# Patient Record
Sex: MALE | Race: WHITE | URBAN - METROPOLITAN AREA
[De-identification: names, ages, dates, MRNs, and addresses within clinical notes are randomized per-mention and may not be internally consistent; named-entity substitution may affect disease eponyms.]

---

## 2018-07-20 ENCOUNTER — OUTPATIENT (OUTPATIENT)
Dept: OUTPATIENT SERVICES | Facility: HOSPITAL | Age: 48
LOS: 1 days | Discharge: HOME | End: 2018-07-20

## 2018-07-20 DIAGNOSIS — R60.9 EDEMA, UNSPECIFIED: ICD-10-CM

## 2018-08-15 DIAGNOSIS — K56.49 OTHER IMPACTION OF INTESTINE: ICD-10-CM

## 2018-08-15 DIAGNOSIS — M71.22 SYNOVIAL CYST OF POPLITEAL SPACE [BAKER], LEFT KNEE: ICD-10-CM

## 2018-08-30 ENCOUNTER — INPATIENT (INPATIENT)
Facility: HOSPITAL | Age: 48
LOS: 1 days | Discharge: HOME | End: 2018-09-01
Attending: INTERNAL MEDICINE | Admitting: INTERNAL MEDICINE
Payer: COMMERCIAL

## 2018-08-30 VITALS
TEMPERATURE: 97 F | RESPIRATION RATE: 20 BRPM | SYSTOLIC BLOOD PRESSURE: 186 MMHG | OXYGEN SATURATION: 98 % | DIASTOLIC BLOOD PRESSURE: 94 MMHG | HEART RATE: 82 BPM
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
## 2018-08-30 LAB
ANION GAP SERPL CALC-SCNC: 15 MMOL/L — HIGH (ref 7–14)
BASOPHILS # BLD AUTO: 0.02 K/UL — SIGNIFICANT CHANGE UP (ref 0–0.2)
BASOPHILS NFR BLD AUTO: 0.4 % — SIGNIFICANT CHANGE UP (ref 0–1)
BUN SERPL-MCNC: 12 MG/DL — SIGNIFICANT CHANGE UP (ref 10–20)
CALCIUM SERPL-MCNC: 9.2 MG/DL — SIGNIFICANT CHANGE UP (ref 8.5–10.1)
CHLORIDE SERPL-SCNC: 99 MMOL/L — SIGNIFICANT CHANGE UP (ref 98–110)
CO2 SERPL-SCNC: 26 MMOL/L — SIGNIFICANT CHANGE UP (ref 17–32)
CREAT SERPL-MCNC: 0.9 MG/DL — SIGNIFICANT CHANGE UP (ref 0.7–1.5)
EOSINOPHIL # BLD AUTO: 0.04 K/UL — SIGNIFICANT CHANGE UP (ref 0–0.7)
EOSINOPHIL NFR BLD AUTO: 0.9 % — SIGNIFICANT CHANGE UP (ref 0–8)
ERYTHROCYTE [SEDIMENTATION RATE] IN BLOOD: 24 MM/HR — HIGH (ref 0–10)
GLUCOSE SERPL-MCNC: 141 MG/DL — HIGH (ref 70–99)
HCT VFR BLD CALC: 40.8 % — LOW (ref 42–52)
HGB BLD-MCNC: 14.5 G/DL — SIGNIFICANT CHANGE UP (ref 14–18)
IMM GRANULOCYTES NFR BLD AUTO: 0.2 % — SIGNIFICANT CHANGE UP (ref 0.1–0.3)
LYMPHOCYTES # BLD AUTO: 1.42 K/UL — SIGNIFICANT CHANGE UP (ref 1.2–3.4)
LYMPHOCYTES # BLD AUTO: 30.9 % — SIGNIFICANT CHANGE UP (ref 20.5–51.1)
MCHC RBC-ENTMCNC: 32.2 PG — HIGH (ref 27–31)
MCHC RBC-ENTMCNC: 35.5 G/DL — SIGNIFICANT CHANGE UP (ref 32–37)
MCV RBC AUTO: 90.7 FL — SIGNIFICANT CHANGE UP (ref 80–94)
MONOCYTES # BLD AUTO: 0.58 K/UL — SIGNIFICANT CHANGE UP (ref 0.1–0.6)
MONOCYTES NFR BLD AUTO: 12.6 % — HIGH (ref 1.7–9.3)
NEUTROPHILS # BLD AUTO: 2.52 K/UL — SIGNIFICANT CHANGE UP (ref 1.4–6.5)
NEUTROPHILS NFR BLD AUTO: 55 % — SIGNIFICANT CHANGE UP (ref 42.2–75.2)
NRBC # BLD: 0 /100 WBCS — SIGNIFICANT CHANGE UP (ref 0–0)
PLATELET # BLD AUTO: 151 K/UL — SIGNIFICANT CHANGE UP (ref 130–400)
POTASSIUM SERPL-MCNC: 3.6 MMOL/L — SIGNIFICANT CHANGE UP (ref 3.5–5)
POTASSIUM SERPL-SCNC: 3.6 MMOL/L — SIGNIFICANT CHANGE UP (ref 3.5–5)
RBC # BLD: 4.5 M/UL — LOW (ref 4.7–6.1)
RBC # FLD: 12.7 % — SIGNIFICANT CHANGE UP (ref 11.5–14.5)
SODIUM SERPL-SCNC: 140 MMOL/L — SIGNIFICANT CHANGE UP (ref 135–146)
WBC # BLD: 4.59 K/UL — LOW (ref 4.8–10.8)
WBC # FLD AUTO: 4.59 K/UL — LOW (ref 4.8–10.8)

## 2018-08-30 PROCEDURE — 93970 EXTREMITY STUDY: CPT | Mod: 26

## 2018-08-30 RX ORDER — AMPICILLIN SODIUM AND SULBACTAM SODIUM 250; 125 MG/ML; MG/ML
3 INJECTION, POWDER, FOR SUSPENSION INTRAMUSCULAR; INTRAVENOUS ONCE
Qty: 0 | Refills: 0 | Status: COMPLETED | OUTPATIENT
Start: 2018-08-30 | End: 2018-08-30

## 2018-08-30 RX ORDER — ENOXAPARIN SODIUM 100 MG/ML
40 INJECTION SUBCUTANEOUS EVERY 24 HOURS
Qty: 0 | Refills: 0 | Status: DISCONTINUED | OUTPATIENT
Start: 2018-08-30 | End: 2018-09-01

## 2018-08-30 RX ADMIN — AMPICILLIN SODIUM AND SULBACTAM SODIUM 200 GRAM(S): 250; 125 INJECTION, POWDER, FOR SUSPENSION INTRAMUSCULAR; INTRAVENOUS at 20:04

## 2018-08-30 NOTE — ED PROVIDER NOTE - OBJECTIVE STATEMENT
patient states that 6 days ago accidentally hit his Rt leg to wood piece, at that time sustained wound, which he cleaned himself and placed dressing, and did the wound care at home for next two days, on monday suddenly the area around the wound became red with swelling, symptoms continued, patient was in [facility] when this happened, so returned back to [facility] and went to the urgent care center, was given clindamycin PO, took 3.5 days of clindamycin, symptoms did not get better, so went back to urgent care center as he was instructed to do. Patient was sent from urgent care center for further evaluation due to failure of outpatient oral abx. Patient denies any other associated symptoms. Tetanus Toxoid is up to date.

## 2018-08-30 NOTE — H&P ADULT - NSHPPHYSICALEXAM_GEN_ALL_CORE
T(C): 36.2 (08-30-18 @ 17:37), Max: 36.2 (08-30-18 @ 17:37)  HR: 82 (08-30-18 @ 17:37) (82 - 82)  BP: 186/94 (08-30-18 @ 17:37) (186/94 - 186/94)  RR: 20 (08-30-18 @ 17:37) (20 - 20)  SpO2: 98% (08-30-18 @ 17:37) (98% - 98%)    PHYSICAL EXAM:  GENERAL: NAD, speaks in full sentences, no signs of respiratory distress  HEAD:  Atraumatic, Normocephalic  EYES: EOMI, PERRLA, conjunctiva and sclera clear  NECK: Supple, No JVD  CHEST/LUNG: Clear to auscultation bilaterally; No wheeze; No crackles; No accessory muscles used  HEART: Regular rate and rhythm; No murmurs;   ABDOMEN: Soft, Nontender, Nondistended, obese; Bowel sounds present; No guarding  EXTREMITIES:  2+ Peripheral Pulses, No cyanosis or edema  PSYCH: AAOx3  NEUROLOGY: non-focal  SKIN:rt le erythema with swelling, warmth and abrasion with yellow discharge

## 2018-08-30 NOTE — H&P ADULT - HISTORY OF PRESENT ILLNESS
48 y m with pmh of dm, htn, martín on bipap p/w rt le cellulits not improving with oral antibiotics. As per patient , on friday he hit wood and had skin tear and he was having wound care by himself but later he developed rash and swelling of rt LE. on monday, he saw his pmd who started him on clindamycin. Since then, his cellulitis is not improving and so he came to ed. He denies any chills ,fever ,pain, n/v. he denies any other symptoms 48 y m with pmh of dm, htn, martín on bipap p/w rt le cellulits not improving with oral antibiotics. As per patient , on friday he hit wood and had skin tear and he was having wound care by himself but later he developed rash and swelling of rt LE. on monday, he saw his pmd who started him on clindamycin. Since then, his cellulitis is not improving and so he came to ed. He denies any chills ,fever ,pain, n/v. he denies any other symptoms. he received tdap

## 2018-08-30 NOTE — ED ADULT NURSE NOTE - NSIMPLEMENTINTERV_GEN_ALL_ED
Implemented All Universal Safety Interventions:  [place] to call system. Call bell, personal items and telephone within reach. Instruct patient to call for assistance. Room bathroom lighting operational. Non-slip footwear when patient is off stretcher. Physically safe environment: no spills, clutter or unnecessary equipment. Stretcher in lowest position, wheels locked, appropriate side rails in place.

## 2018-08-30 NOTE — H&P ADULT - NSHPLABSRESULTS_GEN_ALL_CORE
14.5   4.59  )-----------( 151      ( 30 Aug 2018 18:06 )             40.8       08-30    140  |  99  |  12  ----------------------------<  141<H>  3.6   |  26  |  0.9    Ca    9.2      30 Aug 2018 18:06                        Lactate Trend            CAPILLARY BLOOD GLUCOSE

## 2018-08-30 NOTE — ED PROVIDER NOTE - CARE PLAN
Principal Discharge DX:	Cellulitis of lower extremity, unspecified laterality  Secondary Diagnosis:	Wound infection

## 2018-08-30 NOTE — ED PROVIDER NOTE - PHYSICAL EXAMINATION
RLE exam: on mid Rt anterior shin has open wound with scanty yellow d/c with large area of surrounding erythema with mild swelling, no crepitus, no fluctuation, knee/ankle joints are not involved, no lymphangitis, distally NVI.

## 2018-08-30 NOTE — ED ADULT TRIAGE NOTE - CHIEF COMPLAINT QUOTE
RLE cellulitis , patient states that he has been on po clindamycin for the past few days redness is getting worse

## 2018-08-30 NOTE — ED PROVIDER NOTE - MEDICAL DECISION MAKING DETAILS
Patient remained stable in ED, discussed with Dr. Adams, patient is admitted to Medicine for IV abx, failed outpatient therapy. Discussed with MAR and patient care is transferred.

## 2018-08-30 NOTE — ED PROVIDER NOTE - PROGRESS NOTE DETAILS
Vascular Tech Lynn called and informed me that patient DVT study is negative. Patient remained stable in ED, discussed with Dr. Adams, patient is admitted to Medicine for IV abx, failed outpatient therapy. Discussed with MAR and patient care is transferred.

## 2018-08-30 NOTE — H&P ADULT - ASSESSMENT
48 y m with pmh of htn, dm, martín on bipapa p/w rt le cellulitis not improving with oral antibiotics    1) rt le cellulitis with yellow discharge and abrasion   will start iv clindamycin  check bc an wound culture  wound care with clean with sterile saline and aply bacitracin and cover with kerlex bid  consider id consult if not improving    2) dm- monitor fs and start insulin if fs >200    3) htn -c/w home meds    4) martín- c/w bipap at night    5) dvt ppx   diet- CC  dispo- home   full code 48 y m with pmh of htn, dm, martín on bipapa p/w rt le cellulitis not improving with oral antibiotics    1) rt le cellulitis with yellow discharge and abrasion   will start iv clindamycin  check bc an wound culture  wound care with clean with sterile saline and aply bacitracin and cover with kerlex bid  consider id consult if not improving  f/u xray    2) dm- monitor fs and start insulin if fs >200    3) htn -c/w home meds    4) martín- c/w bipap at night    5) dvt ppx   diet- CC  dispo- home   full code

## 2018-08-30 NOTE — H&P ADULT - PMH
HTN (hypertension)    RUSTY treated with BiPAP    Type 2 diabetes mellitus with other skin complication

## 2018-08-31 DIAGNOSIS — Z90.3 ACQUIRED ABSENCE OF STOMACH [PART OF]: Chronic | ICD-10-CM

## 2018-08-31 DIAGNOSIS — Z95.828 PRESENCE OF OTHER VASCULAR IMPLANTS AND GRAFTS: Chronic | ICD-10-CM

## 2018-08-31 LAB
ANION GAP SERPL CALC-SCNC: 15 MMOL/L — HIGH (ref 7–14)
BASOPHILS # BLD AUTO: 0.02 K/UL — SIGNIFICANT CHANGE UP (ref 0–0.2)
BASOPHILS NFR BLD AUTO: 0.4 % — SIGNIFICANT CHANGE UP (ref 0–1)
BUN SERPL-MCNC: 12 MG/DL — SIGNIFICANT CHANGE UP (ref 10–20)
CALCIUM SERPL-MCNC: 9.4 MG/DL — SIGNIFICANT CHANGE UP (ref 8.5–10.1)
CHLORIDE SERPL-SCNC: 99 MMOL/L — SIGNIFICANT CHANGE UP (ref 98–110)
CO2 SERPL-SCNC: 26 MMOL/L — SIGNIFICANT CHANGE UP (ref 17–32)
CREAT SERPL-MCNC: 0.9 MG/DL — SIGNIFICANT CHANGE UP (ref 0.7–1.5)
EOSINOPHIL # BLD AUTO: 0.08 K/UL — SIGNIFICANT CHANGE UP (ref 0–0.7)
EOSINOPHIL NFR BLD AUTO: 1.5 % — SIGNIFICANT CHANGE UP (ref 0–8)
GLUCOSE BLDC GLUCOMTR-MCNC: 116 MG/DL — HIGH (ref 70–99)
GLUCOSE BLDC GLUCOMTR-MCNC: 144 MG/DL — HIGH (ref 70–99)
GLUCOSE BLDC GLUCOMTR-MCNC: 148 MG/DL — HIGH (ref 70–99)
GLUCOSE BLDC GLUCOMTR-MCNC: 165 MG/DL — HIGH (ref 70–99)
GLUCOSE SERPL-MCNC: 136 MG/DL — HIGH (ref 70–99)
HCT VFR BLD CALC: 41.5 % — LOW (ref 42–52)
HGB BLD-MCNC: 14.3 G/DL — SIGNIFICANT CHANGE UP (ref 14–18)
IMM GRANULOCYTES NFR BLD AUTO: 0.4 % — HIGH (ref 0.1–0.3)
LYMPHOCYTES # BLD AUTO: 1.89 K/UL — SIGNIFICANT CHANGE UP (ref 1.2–3.4)
LYMPHOCYTES # BLD AUTO: 36.1 % — SIGNIFICANT CHANGE UP (ref 20.5–51.1)
MAGNESIUM SERPL-MCNC: 2.1 MG/DL — SIGNIFICANT CHANGE UP (ref 1.8–2.4)
MCHC RBC-ENTMCNC: 31.6 PG — HIGH (ref 27–31)
MCHC RBC-ENTMCNC: 34.5 G/DL — SIGNIFICANT CHANGE UP (ref 32–37)
MCV RBC AUTO: 91.8 FL — SIGNIFICANT CHANGE UP (ref 80–94)
MONOCYTES # BLD AUTO: 0.71 K/UL — HIGH (ref 0.1–0.6)
MONOCYTES NFR BLD AUTO: 13.6 % — HIGH (ref 1.7–9.3)
NEUTROPHILS # BLD AUTO: 2.51 K/UL — SIGNIFICANT CHANGE UP (ref 1.4–6.5)
NEUTROPHILS NFR BLD AUTO: 48 % — SIGNIFICANT CHANGE UP (ref 42.2–75.2)
PLATELET # BLD AUTO: 160 K/UL — SIGNIFICANT CHANGE UP (ref 130–400)
POTASSIUM SERPL-MCNC: 3.9 MMOL/L — SIGNIFICANT CHANGE UP (ref 3.5–5)
POTASSIUM SERPL-SCNC: 3.9 MMOL/L — SIGNIFICANT CHANGE UP (ref 3.5–5)
RBC # BLD: 4.52 M/UL — LOW (ref 4.7–6.1)
RBC # FLD: 13 % — SIGNIFICANT CHANGE UP (ref 11.5–14.5)
SODIUM SERPL-SCNC: 140 MMOL/L — SIGNIFICANT CHANGE UP (ref 135–146)
WBC # BLD: 5.23 K/UL — SIGNIFICANT CHANGE UP (ref 4.8–10.8)
WBC # FLD AUTO: 5.23 K/UL — SIGNIFICANT CHANGE UP (ref 4.8–10.8)

## 2018-08-31 RX ORDER — AMLODIPINE BESYLATE 2.5 MG/1
5 TABLET ORAL DAILY
Qty: 0 | Refills: 0 | Status: DISCONTINUED | OUTPATIENT
Start: 2018-08-31 | End: 2018-09-01

## 2018-08-31 RX ORDER — LOSARTAN POTASSIUM 100 MG/1
1 TABLET, FILM COATED ORAL
Qty: 0 | Refills: 0 | COMMUNITY

## 2018-08-31 RX ORDER — AMLODIPINE BESYLATE 2.5 MG/1
1 TABLET ORAL
Qty: 0 | Refills: 0 | COMMUNITY

## 2018-08-31 RX ORDER — METFORMIN HYDROCHLORIDE 850 MG/1
500 TABLET ORAL EVERY 12 HOURS
Qty: 0 | Refills: 0 | Status: DISCONTINUED | OUTPATIENT
Start: 2018-08-31 | End: 2018-09-01

## 2018-08-31 RX ORDER — CHLORTHALIDONE 50 MG
1 TABLET ORAL
Qty: 0 | Refills: 0 | COMMUNITY

## 2018-08-31 RX ORDER — LOSARTAN POTASSIUM 100 MG/1
100 TABLET, FILM COATED ORAL DAILY
Qty: 0 | Refills: 0 | Status: DISCONTINUED | OUTPATIENT
Start: 2018-08-31 | End: 2018-09-01

## 2018-08-31 RX ORDER — METFORMIN HYDROCHLORIDE 850 MG/1
1 TABLET ORAL
Qty: 0 | Refills: 0 | COMMUNITY

## 2018-08-31 RX ADMIN — Medication 100 MILLIGRAM(S): at 11:20

## 2018-08-31 RX ADMIN — LOSARTAN POTASSIUM 100 MILLIGRAM(S): 100 TABLET, FILM COATED ORAL at 06:18

## 2018-08-31 RX ADMIN — Medication 1 APPLICATION(S): at 23:28

## 2018-08-31 RX ADMIN — Medication 100 MILLIGRAM(S): at 00:58

## 2018-08-31 RX ADMIN — Medication 100 MILLIGRAM(S): at 23:29

## 2018-08-31 RX ADMIN — AMLODIPINE BESYLATE 5 MILLIGRAM(S): 2.5 TABLET ORAL at 06:17

## 2018-08-31 RX ADMIN — Medication 100 MILLIGRAM(S): at 23:28

## 2018-08-31 NOTE — PROGRESS NOTE ADULT - SUBJECTIVE AND OBJECTIVE BOX
MEKHI MCGEE  48y  Male    Patient is a 48y old  Male who presents with a chief complaint of rt le cellulitis (30 Aug 2018 23:53)      INTERVAL HPI/OVERNIGHT EVENTS: Sleeping comfortably on BIPAP    T(C): 36.1 (08-31-18 @ 08:33), Max: 36.4 (08-31-18 @ 01:40)  HR: 62 (08-31-18 @ 08:33) (62 - 82)  BP: 122/77 (08-31-18 @ 08:33) (122/77 - 186/94)  RR: 18 (08-31-18 @ 08:33) (18 - 20)  SpO2: 98% (08-31-18 @ 08:33) (95% - 98%)  Wt(kg): --Vital Signs Last 24 Hrs  T(C): 36.1 (31 Aug 2018 08:33), Max: 36.4 (31 Aug 2018 01:40)  T(F): 97 (31 Aug 2018 08:33), Max: 97.6 (31 Aug 2018 01:40)  HR: 62 (31 Aug 2018 08:33) (62 - 82)  BP: 122/77 (31 Aug 2018 08:33) (122/77 - 186/94)  BP(mean): --  RR: 18 (31 Aug 2018 08:33) (18 - 20)  SpO2: 98% (31 Aug 2018 08:33) (95% - 98%)    PHYSICAL EXAM:  GENERAL: NAD, well-groomed, well-developed  HEAD:  Atraumatic, Normocephalic  NECK: Supple, No JVD, Normal thyroid  NERVOUS SYSTEM:  Alert & Oriented X3, Good concentration; Motor Strength 5/5 B/L upper and lower extremities; DTRs 2+ intact and symmetric  CHEST/LUNG: Clear to percussion bilaterally; No rales, rhonchi, wheezing, or rubs  HEART: Regular rate and rhythm; No murmurs, rubs, or gallops  ABDOMEN: Soft, Nontender, Nondistended; Bowel sounds present  EXTREMITIES:  2+ Peripheral Pulses, No clubbing, cyanosis, RLE cellulitis/discharge    Consultant(s) Notes Reviewed:  [x ] YES  [ ] NO    Discussed with Consultants/Other Providers/Residents [ x] YES     LABS                         14.3   5.23  )-----------( 160      ( 31 Aug 2018 06:59 )             41.5     08-31    140  |  99  |  12  ----------------------------<  136<H>  3.9   |  26  |  0.9    Ca    9.4      31 Aug 2018 06:59  Mg     2.1     08-31              CAPILLARY BLOOD GLUCOSE  148 (31 Aug 2018 08:33)      POCT Blood Glucose.: 165 mg/dL (31 Aug 2018 11:44)        RADIOLOGY & ADDITIONAL TESTS:    Imaging Personally Reviewed:  [x ] YES  [ ] NO    MEDICATIONS  (STANDING):  amLODIPine   Tablet 5 milliGRAM(s) Oral daily  clindamycin IVPB      clindamycin IVPB 600 milliGRAM(s) IV Intermittent every 8 hours  enoxaparin Injectable 40 milliGRAM(s) SubCutaneous every 24 hours  losartan 100 milliGRAM(s) Oral daily    MEDICATIONS  (PRN):      HEALTH ISSUES - PROBLEM Dx:

## 2018-08-31 NOTE — PROGRESS NOTE ADULT - ASSESSMENT
48 y m with pmh of htn, dm, martín on bipapa p/w rt le cellulitis not improving with oral antibiotics    1) Rt leg cellulitis  continue with clindamycin, improving  f/u blood culture and wound culture  wound care with clean with sterile saline and aply bacitracin and cover with kerlex bid  xray: normal, Dupplex negative for DVT    2) dm- monitor fs and start insulin if fs >200    3) htn -c/w home meds, controlled    4) martín- c/w bipap at night    5) dvt ppx: lovenox  diet- CC  dispo- home   full code

## 2018-08-31 NOTE — PROGRESS NOTE ADULT - SUBJECTIVE AND OBJECTIVE BOX
Patient is a 48y old  Male who presents with a chief complaint of rt le cellulitis (30 Aug 2018 23:53)      Interval events:  Feels better today, the area of redness has decreased.    PAST MEDICAL & SURGICAL HISTORY:  HTN (hypertension)  RUSTY treated with BiPAP  Type 2 diabetes mellitus with other skin complication  History of sleeve gastrectomy      MEDICATIONS  (STANDING):  amLODIPine   Tablet 5 milliGRAM(s) Oral daily  clindamycin IVPB      clindamycin IVPB 600 milliGRAM(s) IV Intermittent every 8 hours  enoxaparin Injectable 40 milliGRAM(s) SubCutaneous every 24 hours  losartan 100 milliGRAM(s) Oral daily    MEDICATIONS  (PRN):          Vital Signs Last 24 Hrs  T(C): 36.1 (31 Aug 2018 08:33), Max: 36.4 (31 Aug 2018 01:40)  T(F): 97 (31 Aug 2018 08:33), Max: 97.6 (31 Aug 2018 01:40)  HR: 62 (31 Aug 2018 08:33) (62 - 82)  BP: 122/77 (31 Aug 2018 08:33) (122/77 - 186/94)  BP(mean): --  RR: 18 (31 Aug 2018 08:33) (18 - 20)  SpO2: 98% (31 Aug 2018 08:33) (95% - 98%)  CAPILLARY BLOOD GLUCOSE  148 (31 Aug 2018 08:33)        I&O's Summary      Physical Exam:    -     General : lying in bed comfortably, no acute distress    -      Cardiac: regular rate and rhythm, no murmur    -      Pulm: b/l clear    -      GI: soft non tender    -      Musculoskeletal: right leg erythema and minimal swelling present, with central area of erosion    -      Neuro: AO x 3, non focal        Labs:                        14.3   5.23  )-----------( 160      ( 31 Aug 2018 06:59 )             41.5             08-31    140  |  99  |  12  ----------------------------<  136<H>  3.9   |  26  |  0.9    Ca    9.4      31 Aug 2018 06:59  Mg     2.1     08-31                            Imaging:    ECG:

## 2018-09-01 ENCOUNTER — TRANSCRIPTION ENCOUNTER (OUTPATIENT)
Age: 48
End: 2018-09-01

## 2018-09-01 VITALS
RESPIRATION RATE: 20 BRPM | SYSTOLIC BLOOD PRESSURE: 130 MMHG | HEART RATE: 63 BPM | TEMPERATURE: 98 F | DIASTOLIC BLOOD PRESSURE: 66 MMHG

## 2018-09-01 LAB
CULTURE RESULTS: SIGNIFICANT CHANGE UP
GLUCOSE BLDC GLUCOMTR-MCNC: 149 MG/DL — HIGH (ref 70–99)
GLUCOSE BLDC GLUCOMTR-MCNC: 168 MG/DL — HIGH (ref 70–99)
HCT VFR BLD CALC: 42.6 % — SIGNIFICANT CHANGE UP (ref 42–52)
HGB BLD-MCNC: 15 G/DL — SIGNIFICANT CHANGE UP (ref 14–18)
MCHC RBC-ENTMCNC: 31.9 PG — HIGH (ref 27–31)
MCHC RBC-ENTMCNC: 35.2 G/DL — SIGNIFICANT CHANGE UP (ref 32–37)
MCV RBC AUTO: 90.6 FL — SIGNIFICANT CHANGE UP (ref 80–94)
NRBC # BLD: 0 /100 WBCS — SIGNIFICANT CHANGE UP (ref 0–0)
PLATELET # BLD AUTO: 162 K/UL — SIGNIFICANT CHANGE UP (ref 130–400)
RBC # BLD: 4.7 M/UL — SIGNIFICANT CHANGE UP (ref 4.7–6.1)
RBC # FLD: 12.8 % — SIGNIFICANT CHANGE UP (ref 11.5–14.5)
SPECIMEN SOURCE: SIGNIFICANT CHANGE UP
WBC # BLD: 5.31 K/UL — SIGNIFICANT CHANGE UP (ref 4.8–10.8)
WBC # FLD AUTO: 5.31 K/UL — SIGNIFICANT CHANGE UP (ref 4.8–10.8)

## 2018-09-01 RX ORDER — DEXTROSE 50 % IN WATER 50 %
12.5 SYRINGE (ML) INTRAVENOUS ONCE
Qty: 0 | Refills: 0 | Status: DISCONTINUED | OUTPATIENT
Start: 2018-09-01 | End: 2018-09-01

## 2018-09-01 RX ORDER — DEXTROSE 50 % IN WATER 50 %
25 SYRINGE (ML) INTRAVENOUS ONCE
Qty: 0 | Refills: 0 | Status: DISCONTINUED | OUTPATIENT
Start: 2018-09-01 | End: 2018-09-01

## 2018-09-01 RX ORDER — GLUCAGON INJECTION, SOLUTION 0.5 MG/.1ML
1 INJECTION, SOLUTION SUBCUTANEOUS ONCE
Qty: 0 | Refills: 0 | Status: DISCONTINUED | OUTPATIENT
Start: 2018-09-01 | End: 2018-09-01

## 2018-09-01 RX ORDER — INSULIN LISPRO 100/ML
VIAL (ML) SUBCUTANEOUS
Qty: 0 | Refills: 0 | Status: DISCONTINUED | OUTPATIENT
Start: 2018-09-01 | End: 2018-09-01

## 2018-09-01 RX ORDER — SODIUM CHLORIDE 9 MG/ML
1000 INJECTION, SOLUTION INTRAVENOUS
Qty: 0 | Refills: 0 | Status: DISCONTINUED | OUTPATIENT
Start: 2018-09-01 | End: 2018-09-01

## 2018-09-01 RX ORDER — DEXTROSE 50 % IN WATER 50 %
15 SYRINGE (ML) INTRAVENOUS ONCE
Qty: 0 | Refills: 0 | Status: DISCONTINUED | OUTPATIENT
Start: 2018-09-01 | End: 2018-09-01

## 2018-09-01 RX ADMIN — METFORMIN HYDROCHLORIDE 500 MILLIGRAM(S): 850 TABLET ORAL at 06:41

## 2018-09-01 RX ADMIN — Medication 100 MILLIGRAM(S): at 14:11

## 2018-09-01 RX ADMIN — Medication 1 APPLICATION(S): at 09:16

## 2018-09-01 RX ADMIN — LOSARTAN POTASSIUM 100 MILLIGRAM(S): 100 TABLET, FILM COATED ORAL at 06:43

## 2018-09-01 RX ADMIN — AMLODIPINE BESYLATE 5 MILLIGRAM(S): 2.5 TABLET ORAL at 06:41

## 2018-09-01 RX ADMIN — METFORMIN HYDROCHLORIDE 500 MILLIGRAM(S): 850 TABLET ORAL at 00:08

## 2018-09-01 RX ADMIN — Medication 100 MILLIGRAM(S): at 07:52

## 2018-09-01 NOTE — DISCHARGE NOTE ADULT - HOSPITAL COURSE
48 y man with pmh of htn, dm, martín on bipapa admitted for Right Lower extremity cellulitis not improving with oral antibiotics    #Right Lower Extremity Cellulitis  - Wound cultures and blood cultures negative  - Xray Right Tibia/Fibula: Normal, LE Doppler- NO DVT  - remains afebrile, WBC 5.23,  ESR 24 (WNL)  - Day2 IV Clindamycin   - wound care with clean with sterile saline and aply bacitracin and cover with kerlex bid  - Discharged after clinical improvement on IV antibiotics  - Complete recommended course of antibiotics and follow up with PMD    #DM-  - Held home Metformin, FSG were in appropriate range, corrective insulin was ordered as needed    # HTN- well controlled, continued home meds while hospitalized     #MARTÍN - continued with bipap at night

## 2018-09-01 NOTE — DISCHARGE NOTE ADULT - INSTRUCTIONS
Please maintain a well-balanced, healthy diet high in whole grains, fiber, fruits and vegetables. Limit sugar intake and continue to monitor finger stick glucose as recommended by your primary care doctor SSD q12

## 2018-09-01 NOTE — DISCHARGE NOTE ADULT - PLAN OF CARE
Optimal wound healing, prevent worsening or recurrence of infection You have been treated with IV antibiotics while in the hospital for your cellulitis. When you leave the hospital, you should complete the recommended course of antibiotics. Follow up with your primary care doctor as necessary You have been treated with IV antibiotics while in the hospital for your cellulitis. When you leave the hospital, you should complete the recommended course of antibiotics. Follow up with your primary care doctor as necessary and infectious disease doctor Dr. Guidry if the infection looks unimproved.

## 2018-09-01 NOTE — DISCHARGE NOTE ADULT - MEDICATION SUMMARY - MEDICATIONS TO TAKE
I will START or STAY ON the medications listed below when I get home from the hospital:    losartan 100 mg oral tablet  -- 1 tab(s) by mouth once a day  -- Indication: For HTN (hypertension)    metFORMIN 500 mg oral tablet  -- 1 tab(s) by mouth 2 times a day  -- Indication: For Diabetes    amLODIPine 5 mg oral tablet  -- 1 tab(s) by mouth once a day  -- Indication: For HTN (hypertension)    silver sulfADIAZINE 1% topical cream  -- 1 application on skin 2 times a day  -- Indication: For CELLULITIS OF LOWER EXTREMITY,UNSPECIFIED LATERALITY;WOUND INFECTION    chlorthalidone 15 mg oral tablet  -- 1 tab(s) by mouth once a day  -- Indication: For HTN (hypertension)    clindamycin 300 mg oral capsule  -- 1 cap(s) by mouth 4 times a day   -- Finish all this medication unless otherwise directed by prescriber.  Medication should be taken with plenty of water.    -- Indication: For CELLULITIS OF LOWER EXTREMITY,UNSPECIFIED LATERALITY;WOUND INFECTION

## 2018-09-01 NOTE — DISCHARGE NOTE ADULT - CARE PLAN
Principal Discharge DX:	Cellulitis of lower extremity, unspecified laterality  Goal:	Optimal wound healing, prevent worsening or recurrence of infection  Assessment and plan of treatment:	You have been treated with IV antibiotics while in the hospital for your cellulitis. When you leave the hospital, you should complete the recommended course of antibiotics. Follow up with your primary care doctor as necessary Principal Discharge DX:	Cellulitis of lower extremity, unspecified laterality  Goal:	Optimal wound healing, prevent worsening or recurrence of infection  Assessment and plan of treatment:	You have been treated with IV antibiotics while in the hospital for your cellulitis. When you leave the hospital, you should complete the recommended course of antibiotics. Follow up with your primary care doctor as necessary and infectious disease doctor Dr. Guidry if the infection looks unimproved.

## 2018-09-01 NOTE — PROGRESS NOTE ADULT - SUBJECTIVE AND OBJECTIVE BOX
MEKHI MCGEE  48y  Male    Patient is a 48y old  Male who presents with a chief complaint of right lower extremity cellulitis (31 Aug 2018 23:01)      INTERVAL HPI/OVERNIGHT EVENTS: None    T(C): 36.4 (08-31-18 @ 23:31), Max: 36.5 (08-31-18 @ 21:56)  HR: 80 (08-31-18 @ 23:31) (62 - 80)  BP: 112/65 (08-31-18 @ 23:31) (112/65 - 146/86)  RR: 18 (08-31-18 @ 23:31) (18 - 18)  SpO2: 99% (08-31-18 @ 23:31) (95% - 100%)  Wt(kg): --Vital Signs Last 24 Hrs  T(C): 36.4 (31 Aug 2018 23:31), Max: 36.5 (31 Aug 2018 21:56)  T(F): 97.6 (31 Aug 2018 23:31), Max: 97.7 (31 Aug 2018 21:56)  HR: 80 (31 Aug 2018 23:31) (62 - 80)  BP: 112/65 (31 Aug 2018 23:31) (112/65 - 146/86)  BP(mean): --  RR: 18 (31 Aug 2018 23:31) (18 - 18)  SpO2: 99% (31 Aug 2018 23:31) (95% - 100%)    PHYSICAL EXAM:  GENERAL: NAD, well-groomed, well-developed  HEAD:  Atraumatic, Normocephalic  NECK: Supple, No JVD, Normal thyroid  NERVOUS SYSTEM:  Alert & Oriented X3, Good concentration; Motor Strength 5/5 B/L upper and lower extremities; DTRs 2+ intact and symmetric  CHEST/LUNG: Clear to percussion bilaterally; No rales, rhonchi, wheezing, or rubs  HEART: Regular rate and rhythm; No murmurs, rubs, or gallops  ABDOMEN: Soft, Nontender, Nondistended; Bowel sounds present  EXTREMITIES:  2+ Peripheral Pulses, No clubbing, cyanosis, or edema    Consultant(s) Notes Reviewed:  [x ] YES  [ ] NO    Discussed with Consultants/Other Providers/Residents [ x] YES     LABS                         14.3   5.23  )-----------( 160      ( 31 Aug 2018 06:59 )             41.5     08-31    140  |  99  |  12  ----------------------------<  136<H>  3.9   |  26  |  0.9    Ca    9.4      31 Aug 2018 06:59  Mg     2.1     08-31            Culture - Other (collected 30 Aug 2018 18:35)  Source: .Other leg wound  Preliminary Report (31 Aug 2018 20:15):    No growth to date.    Culture - Blood (collected 30 Aug 2018 18:06)  Source: .Blood Blood-Venous  Preliminary Report (01 Sep 2018 01:02):    No growth to date.    Culture - Blood (collected 30 Aug 2018 18:06)  Source: .Blood Blood-Venous  Preliminary Report (01 Sep 2018 01:02):    No growth to date.        CAPILLARY BLOOD GLUCOSE  148 (31 Aug 2018 08:33)      POCT Blood Glucose.: 116 mg/dL (31 Aug 2018 22:52)        RADIOLOGY & ADDITIONAL TESTS:    Imaging Personally Reviewed:  [x ] YES  [ ] NO    MEDICATIONS  (STANDING):  amLODIPine   Tablet 5 milliGRAM(s) Oral daily  clindamycin IVPB      clindamycin IVPB 600 milliGRAM(s) IV Intermittent every 8 hours  enoxaparin Injectable 40 milliGRAM(s) SubCutaneous every 24 hours  losartan 100 milliGRAM(s) Oral daily  metFORMIN 500 milliGRAM(s) Oral every 12 hours  silver sulfADIAZINE 1% Cream 1 Application(s) Topical two times a day    MEDICATIONS  (PRN):      HEALTH ISSUES - PROBLEM Dx:

## 2018-09-01 NOTE — DISCHARGE NOTE ADULT - ADDITIONAL INSTRUCTIONS
Follow up with your primary doctor within 1-2 weeks of discharge.   IF your wound increases in size, redness or swelling becomes worse, you have odorous discharge or pus coming from the wound, or you have fevers- please come back to the emergency room

## 2018-09-01 NOTE — PROGRESS NOTE ADULT - ASSESSMENT
MEKHI MCGEE 48y Male  MRN#: [id number]   CODE STATUS:__ FULL_      SUBJECTIVE  Patient is a 48y old Male who presents with a chief complaint of right lower extremity cellulitis (31 Aug 2018 23:01)  Currently admitted to medicine with the primary diagnosis of Right lower extremity Cellulitis to be treated with IV antibiotics    Today is hospital day 2d, there were no acute events overnight. This morning he was resting comfortably in bed. He feels like his leg swelling and redness is improving. Denies fevers/chills/ pain in legs, no CP or SOB. Does not report any new complaints    Present Today:           Brown Catheter (x)No/ ()Yes? Indication:          Central Line (x)No/ ()Yes? Indication:          IV Fluids (x)No/ ()Yes? Type:  Rate:  Indication:      OBJECTIVE  PAST MEDICAL & SURGICAL HISTORY  HTN (hypertension)  RUSTY treated with BiPAP  Type 2 diabetes mellitus with other skin complication  Presence of IVC filter:   History of sleeve gastrectomy:     ALLERGIES:  No Known Allergies    MEDICATIONS:  STANDING MEDICATIONS  amLODIPine   Tablet 5 milliGRAM(s) Oral daily  clindamycin IVPB      clindamycin IVPB 600 milliGRAM(s) IV Intermittent every 8 hours  dextrose 5%. 1000 milliLiter(s) IV Continuous <Continuous>  dextrose 50% Injectable 12.5 Gram(s) IV Push once  dextrose 50% Injectable 25 Gram(s) IV Push once  dextrose 50% Injectable 25 Gram(s) IV Push once  enoxaparin Injectable 40 milliGRAM(s) SubCutaneous every 24 hours  insulin lispro (HumaLOG) corrective regimen sliding scale   SubCutaneous three times a day before meals  losartan 100 milliGRAM(s) Oral daily  silver sulfADIAZINE 1% Cream 1 Application(s) Topical two times a day    PRN MEDICATIONS  dextrose 40% Gel 15 Gram(s) Oral once PRN  glucagon  Injectable 1 milliGRAM(s) IntraMuscular once PRN      VITAL SIGNS: Last 24 Hours  T(C): 36.4 (01 Sep 2018 05:04), Max: 36.5 (31 Aug 2018 21:56)  T(F): 97.6 (01 Sep 2018 05:04), Max: 97.7 (31 Aug 2018 21:56)  HR: 61 (01 Sep 2018 05:04) (61 - 80)  BP: 134/76 (01 Sep 2018 05:04) (112/65 - 146/86)  BP(mean): --  RR: 20 (01 Sep 2018 05:04) (18 - 20)  SpO2: 99% (31 Aug 2018 23:31) (98% - 100%)    LABS:                        14.3   5.23  )-----------( 160      ( 31 Aug 2018 06:59 )             41.5     0831    140  |  99  |  12  ----------------------------<  136<H>  3.9   |  26  |  0.9    Ca    9.4      31 Aug 2018 06:59  Mg     2.1                 Culture - Other (collected 30 Aug 2018 18:35)  Source: .Other leg wound  Preliminary Report (31 Aug 2018 20:15):    No growth to date.    Culture - Blood (collected 30 Aug 2018 18:06)  Source: .Blood Blood-Venous  Preliminary Report (01 Sep 2018 01:02):    No growth to date.    Culture - Blood (collected 30 Aug 2018 18:06)  Source: .Blood Blood-Venous  Preliminary Report (01 Sep 2018 01:02):    No growth to date.        RADIOLOGY:  < from: Xray Tibia + Fibula 2 Views, Right (18 @ 19:23) >    INTERPRETATION:  Clinical History / Reason for exam: Status post trauma    Comparison: None    Findings/  impression:    There is no evidence of acute displaced fracture or dislocation.   Degenerative changes involving the knee and ankle joints.      VA Duplex Lower Ext Vein Scan, Bilat (18 @ 18:56) >  Impression:    No evidence of deep venous thrombosis or superficial thrombophlebitis in   the bilateral lower extremities.    Left Baker's cyst as measured above    < end of copied text >        PHYSICAL EXAM:    GENERAL: NAD, well-developed, obese male, AAOx3  HEENT:  Atraumatic, Normocephalic. EOMI, PERRLA, conjunctiva and sclera clear  PULMONARY: Clear to auscultation bilaterally; No wheeze  CARDIOVASCULAR: Regular rate and rhythm; No murmurs, rubs, or gallops  GASTROINTESTINAL: Soft, Nontender, Nondistended; Bowel sounds present  NEUROLOGY: non-focal  Extremities: warm, 2+ Peripheral Pulses, 1+ edema on right lower extremity in foot, no tenderness to palpation in calves,  Right lecm erosion with granulation tissue on right shin with surrounding erythema and edema      ASSESSMENT & PLAN  48 y man with pmh of htn, dm, rusty on bipapa admitted for Right Lower extremity cellulitis not improving with oral antibiotics    #Right Lower Extremity Cellulitis  - Wound cultures and blood cultures negative  - Xray Right Tibia/Fibula: Normal, LE Doppler- NO DVT  - remains afebrile, WBC 5.23,  ESR 24 (WNL)  - Day2 IV Clindamycin   - wound care with clean with sterile saline and aply bacitracin and cover with kerlex bid  - FU ID consult      #DM-  - Hold home Metformin  - monitor fs and start insulin if fs >200    # HTN  Cw home meds    #RUSTY   c/w bipap at night    #Other  dvt ppx: Lovenox 40  diet- Carb consistent  dispo- home   full code MEKHI MCGEE 48y Male  MRN#: [id number]   CODE STATUS:__ FULL_      SUBJECTIVE  Patient is a 48y old Male who presents with a chief complaint of right lower extremity cellulitis (31 Aug 2018 23:01)  Currently admitted to medicine with the primary diagnosis of Right lower extremity Cellulitis to be treated with IV antibiotics    Today is hospital day 2d, there were no acute events overnight. This morning he was resting comfortably in bed. He feels like his leg swelling and redness is improving. Denies fevers/chills/ pain in legs, no CP or SOB. Does not report any new complaints    Present Today:           Brown Catheter (x)No/ ()Yes? Indication:          Central Line (x)No/ ()Yes? Indication:          IV Fluids (x)No/ ()Yes? Type:  Rate:  Indication:      OBJECTIVE  PAST MEDICAL & SURGICAL HISTORY  HTN (hypertension)  RUSTY treated with BiPAP  Type 2 diabetes mellitus with other skin complication  Presence of IVC filter:   History of sleeve gastrectomy:     ALLERGIES:  No Known Allergies    MEDICATIONS:  STANDING MEDICATIONS  amLODIPine   Tablet 5 milliGRAM(s) Oral daily  clindamycin IVPB      clindamycin IVPB 600 milliGRAM(s) IV Intermittent every 8 hours  dextrose 5%. 1000 milliLiter(s) IV Continuous <Continuous>  dextrose 50% Injectable 12.5 Gram(s) IV Push once  dextrose 50% Injectable 25 Gram(s) IV Push once  dextrose 50% Injectable 25 Gram(s) IV Push once  enoxaparin Injectable 40 milliGRAM(s) SubCutaneous every 24 hours  insulin lispro (HumaLOG) corrective regimen sliding scale   SubCutaneous three times a day before meals  losartan 100 milliGRAM(s) Oral daily  silver sulfADIAZINE 1% Cream 1 Application(s) Topical two times a day    PRN MEDICATIONS  dextrose 40% Gel 15 Gram(s) Oral once PRN  glucagon  Injectable 1 milliGRAM(s) IntraMuscular once PRN      VITAL SIGNS: Last 24 Hours  T(C): 36.4 (01 Sep 2018 05:04), Max: 36.5 (31 Aug 2018 21:56)  T(F): 97.6 (01 Sep 2018 05:04), Max: 97.7 (31 Aug 2018 21:56)  HR: 61 (01 Sep 2018 05:04) (61 - 80)  BP: 134/76 (01 Sep 2018 05:04) (112/65 - 146/86)  BP(mean): --  RR: 20 (01 Sep 2018 05:04) (18 - 20)  SpO2: 99% (31 Aug 2018 23:31) (98% - 100%)    LABS:                        14.3   5.23  )-----------( 160      ( 31 Aug 2018 06:59 )             41.5     0831    140  |  99  |  12  ----------------------------<  136<H>  3.9   |  26  |  0.9    Ca    9.4      31 Aug 2018 06:59  Mg     2.1                 Culture - Other (collected 30 Aug 2018 18:35)  Source: .Other leg wound  Preliminary Report (31 Aug 2018 20:15):    No growth to date.    Culture - Blood (collected 30 Aug 2018 18:06)  Source: .Blood Blood-Venous  Preliminary Report (01 Sep 2018 01:02):    No growth to date.    Culture - Blood (collected 30 Aug 2018 18:06)  Source: .Blood Blood-Venous  Preliminary Report (01 Sep 2018 01:02):    No growth to date.        RADIOLOGY:  < from: Xray Tibia + Fibula 2 Views, Right (18 @ 19:23) >    INTERPRETATION:  Clinical History / Reason for exam: Status post trauma    Comparison: None    Findings/  impression:    There is no evidence of acute displaced fracture or dislocation.   Degenerative changes involving the knee and ankle joints.      VA Duplex Lower Ext Vein Scan, Bilat (18 @ 18:56) >  Impression:    No evidence of deep venous thrombosis or superficial thrombophlebitis in   the bilateral lower extremities.    Left Baker's cyst as measured above    < end of copied text >        PHYSICAL EXAM:    GENERAL: NAD, well-developed, obese male, AAOx3  HEENT:  Atraumatic, Normocephalic. EOMI, PERRLA, conjunctiva and sclera clear  PULMONARY: Clear to auscultation bilaterally; No wheeze  CARDIOVASCULAR: Regular rate and rhythm; No murmurs, rubs, or gallops  GASTROINTESTINAL: Soft, Nontender, Nondistended; Bowel sounds present  NEUROLOGY: non-focal  Extremities: warm, 2+ Peripheral Pulses, 1+ edema on right lower extremity in foot, no tenderness to palpation in calves,  Right lecm erosion with granulation tissue on right shin with surrounding erythema and edema      ASSESSMENT & PLAN  48 y man with pmh of htn, dm, rusyt on bipapa admitted for Right Lower extremity cellulitis not improving with oral antibiotics    #Right Lower Extremity Cellulitis  - Wound cultures and blood cultures negative  - Xray Right Tibia/Fibula: Normal, LE Doppler- NO DVT  - remains afebrile, WBC 5.23,  ESR 24 (WNL)  - Day2 IV Clindamycin   - wound care with clean with sterile saline and apply bacitracin and cover with kerlex bid  - FU ID consult- consider adding zosyn for pseudomonas coverage considering patient is diabetic?      #DM-   - Holding home Metformin  - monitor fs and start insulin if fs >200    # HTN  Cw home meds    #RUSTY   c/w bipap at night    #Other  dvt ppx: Lovenox 40  diet- Carb consistent  dispo- home   full code

## 2018-09-01 NOTE — DISCHARGE NOTE ADULT - PATIENT PORTAL LINK FT
You can access the [facility], offered by [facility], by registering with the following website: [email or web address]

## 2018-09-05 LAB
CULTURE RESULTS: SIGNIFICANT CHANGE UP
CULTURE RESULTS: SIGNIFICANT CHANGE UP
SPECIMEN SOURCE: SIGNIFICANT CHANGE UP
SPECIMEN SOURCE: SIGNIFICANT CHANGE UP

## 2018-09-06 DIAGNOSIS — Z98.84 BARIATRIC SURGERY STATUS: ICD-10-CM

## 2018-09-06 DIAGNOSIS — L03.115 CELLULITIS OF RIGHT LOWER LIMB: ICD-10-CM

## 2018-09-06 DIAGNOSIS — E11.628 TYPE 2 DIABETES MELLITUS WITH OTHER SKIN COMPLICATIONS: ICD-10-CM

## 2018-09-06 DIAGNOSIS — G47.33 OBSTRUCTIVE SLEEP APNEA (ADULT) (PEDIATRIC): ICD-10-CM
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
## 2018-09-06 DIAGNOSIS — S70.311A ABRASION, RIGHT THIGH, INITIAL ENCOUNTER: ICD-10-CM

## 2018-09-06 DIAGNOSIS — I10 ESSENTIAL (PRIMARY) HYPERTENSION: ICD-10-CM

## 2022-03-09 NOTE — ED ADULT NURSE NOTE - CAS EDN DISCHARGE ASSESSMENT
Informed pt that I would be calling the pharmacy with new directions for new script    Alert and oriented to person, place and time/Awake/No adverse reaction to first time med in ED/Patient baseline mental status

## 2022-12-02 NOTE — H&P ADULT - ATTENDING COMMENTS
48 y m with pmh of htn, dm, martín on bipapa p/w rt le cellulitis not improving with oral antibiotics    1.  rt le cellulitis / discharge        start iv clindamycin     check bc /  wound culture     wound care:  sterile saline /bacitracin / kerlex       ID eval      f/u xray    2.  dm-     monitor fs      start insulin if fs >200    3. htn -c/w home meds    4. martín- c/w bipap at night    5) dvt ppx   diet- CC  dispo- home   full code
rolling walker

## 2023-03-16 NOTE — PROGRESS NOTE ADULT - ATTENDING COMMENTS
Addended by: MORTIMER, MARCIA L on: 3/16/2023 04:17 PM     Modules accepted: Orders    
48 y m with pmh of htn, dm, martín on bipapa p/w rt le cellulitis not improving with oral antibiotics    1.  rt le cellulitis / discharge        start iv clindamycin     check bc /  wound culture     wound care:  sterile saline /bacitracin / kerlex       ID eval       xray neg; LE Doppler no DVT    2.  dm-     monitor fs      start insulin if fs >200    3. htn -c/w home meds    4. martín- c/w bipap at night    5) dvt ppx   diet- CC  dispo- home   full code.   4
8 y m with pmh of htn, dm, martín on bipapa p/w rt le cellulitis not improving with oral antibiotics    1.  rt le cellulitis / discharge        start iv clindamycin     check bc /  wound culture     wound care:  sterile saline /bacitracin / kerlex       ID eval       xray neg; LE Doppler no DVT    2.  dm-     monitor fs      start insulin if fs >200    3. htn -c/w home meds    4. martín- c/w bipap at night    5) dvt ppx   diet- CC  dispo- home   full code

## 2023-11-28 PROBLEM — I10 ESSENTIAL (PRIMARY) HYPERTENSION: Chronic | Status: ACTIVE | Noted: 2018-08-31

## 2023-11-28 PROBLEM — G47.33 OBSTRUCTIVE SLEEP APNEA (ADULT) (PEDIATRIC): Chronic | Status: ACTIVE | Noted: 2018-08-31

## 2023-11-28 PROBLEM — E11.628 TYPE 2 DIABETES MELLITUS WITH OTHER SKIN COMPLICATIONS: Chronic | Status: ACTIVE | Noted: 2018-08-30

## 2024-02-16 PROBLEM — Z00.00 ENCOUNTER FOR PREVENTIVE HEALTH EXAMINATION: Status: ACTIVE | Noted: 2024-02-16

## 2024-05-07 ENCOUNTER — OUTPATIENT (OUTPATIENT)
Dept: OUTPATIENT SERVICES | Facility: HOSPITAL | Age: 54
LOS: 1 days | End: 2024-05-07
Payer: COMMERCIAL

## 2024-05-07 DIAGNOSIS — Z00.8 ENCOUNTER FOR OTHER GENERAL EXAMINATION: ICD-10-CM

## 2024-05-07 DIAGNOSIS — Z90.3 ACQUIRED ABSENCE OF STOMACH [PART OF]: Chronic | ICD-10-CM

## 2024-05-07 DIAGNOSIS — Z95.828 PRESENCE OF OTHER VASCULAR IMPLANTS AND GRAFTS: Chronic | ICD-10-CM

## 2024-05-07 DIAGNOSIS — R94.39 ABNORMAL RESULT OF OTHER CARDIOVASCULAR FUNCTION STUDY: ICD-10-CM

## 2024-05-07 PROCEDURE — 75571 CT HRT W/O DYE W/CA TEST: CPT

## 2024-05-07 PROCEDURE — 75571 CT HRT W/O DYE W/CA TEST: CPT | Mod: 26

## 2024-05-08 DIAGNOSIS — R94.39 ABNORMAL RESULT OF OTHER CARDIOVASCULAR FUNCTION STUDY: ICD-10-CM

## 2024-05-14 ENCOUNTER — APPOINTMENT (OUTPATIENT)
Dept: PULMONOLOGY | Facility: CLINIC | Age: 54
End: 2024-05-14
Payer: COMMERCIAL

## 2024-05-14 VITALS
HEIGHT: 69 IN | OXYGEN SATURATION: 96 % | HEART RATE: 86 BPM | DIASTOLIC BLOOD PRESSURE: 74 MMHG | SYSTOLIC BLOOD PRESSURE: 126 MMHG | BODY MASS INDEX: 46.65 KG/M2 | WEIGHT: 315 LBS

## 2024-05-14 DIAGNOSIS — Z87.891 PERSONAL HISTORY OF NICOTINE DEPENDENCE: ICD-10-CM

## 2024-05-14 DIAGNOSIS — G47.33 OBSTRUCTIVE SLEEP APNEA (ADULT) (PEDIATRIC): ICD-10-CM

## 2024-05-14 DIAGNOSIS — E66.9 OBESITY, UNSPECIFIED: ICD-10-CM

## 2024-05-14 PROCEDURE — 99203 OFFICE O/P NEW LOW 30 MIN: CPT

## 2024-05-14 RX ORDER — LABETALOL HYDROCHLORIDE 300 MG/1
TABLET, FILM COATED ORAL
Refills: 0 | Status: ACTIVE | COMMUNITY

## 2024-05-14 RX ORDER — GLYBURIDE 2.5 MG/1
2.5 TABLET ORAL
Refills: 0 | Status: ACTIVE | COMMUNITY

## 2024-05-14 RX ORDER — AMLODIPINE BESYLATE 5 MG/1
5 TABLET ORAL
Refills: 0 | Status: ACTIVE | COMMUNITY

## 2024-05-14 RX ORDER — LOSARTAN POTASSIUM 100 MG/1
TABLET, FILM COATED ORAL
Refills: 0 | Status: ACTIVE | COMMUNITY

## 2024-05-15 PROBLEM — G47.33 OBSTRUCTIVE SLEEP APNEA, ADULT: Status: ACTIVE | Noted: 2024-05-15

## 2024-05-15 PROBLEM — E66.9 OBESITY (BMI 30-39.9): Status: ACTIVE | Noted: 2024-05-15

## 2024-05-15 NOTE — ASSESSMENT
[FreeTextEntry1] : The patient has a diagnosis of sleep apnea. It appears stable while on CPAP therapy.  He is very compliant with its use. I will make arrangements to acquire prior sleep testing from the previous physician's office Patient needs a new CPAP set up and supplies. He has been using [employer] as his vendor.  We will continue this. He will follow-up with me in about 3 months after receiving his new CPAP unit

## 2024-05-15 NOTE — HISTORY OF PRESENT ILLNESS
[TextBox_4] : The patient carries a diagnosis of sleep apnea for many years. He has been treated at another doctor's office but has developed issues.  He now wants to move on to our office. His CPAP is very many years old.  It is starting to cause electronically issues with shutting itself off at night. He has been very compliant with the CPAP and it seems to be providing good relief of the sleep apnea when it is functioning properly. We will need to obtain old sleep studies from the prior physician's office.  Release was requested

## 2024-05-17 ENCOUNTER — RESULT REVIEW (OUTPATIENT)
Age: 54
End: 2024-05-17

## 2024-05-17 ENCOUNTER — OUTPATIENT (OUTPATIENT)
Dept: OUTPATIENT SERVICES | Facility: HOSPITAL | Age: 54
LOS: 1 days | End: 2024-05-17
Payer: COMMERCIAL

## 2024-05-17 ENCOUNTER — APPOINTMENT (OUTPATIENT)
Age: 54
End: 2024-05-17
Payer: COMMERCIAL

## 2024-05-17 DIAGNOSIS — Z90.3 ACQUIRED ABSENCE OF STOMACH [PART OF]: Chronic | ICD-10-CM

## 2024-05-17 DIAGNOSIS — R06.02 SHORTNESS OF BREATH: ICD-10-CM

## 2024-05-17 DIAGNOSIS — Z95.828 PRESENCE OF OTHER VASCULAR IMPLANTS AND GRAFTS: Chronic | ICD-10-CM

## 2024-05-17 PROCEDURE — 93306 TTE W/DOPPLER COMPLETE: CPT | Mod: 26

## 2024-05-17 PROCEDURE — 93306 TTE W/DOPPLER COMPLETE: CPT

## 2024-05-18 DIAGNOSIS — R06.02 SHORTNESS OF BREATH: ICD-10-CM

## 2024-08-20 ENCOUNTER — APPOINTMENT (OUTPATIENT)
Dept: PULMONOLOGY | Facility: CLINIC | Age: 54
End: 2024-08-20
Payer: COMMERCIAL

## 2024-08-20 DIAGNOSIS — E66.9 OBESITY, UNSPECIFIED: ICD-10-CM

## 2024-08-20 DIAGNOSIS — G47.33 OBSTRUCTIVE SLEEP APNEA (ADULT) (PEDIATRIC): ICD-10-CM

## 2024-08-20 PROCEDURE — 99213 OFFICE O/P EST LOW 20 MIN: CPT

## 2024-08-20 PROCEDURE — G2211 COMPLEX E/M VISIT ADD ON: CPT | Mod: NC

## 2024-08-20 NOTE — HISTORY OF PRESENT ILLNESS
[TextBox_4] : - Summary : Elmer Steele has been using a machine for over a decade now. Recently, he got a replacement machine that was approved by insurance. He hasn't been facing any significant issues, aside from occasional seal breakage, which he fixes by tightening the fit. - Chief Complaint (CC) : Routine check-up and maintenance - History of Present Illness (HPI) : Elmer Steele has been compliant with the usage of his machine. He recently received a new model from his insurance. The new model is different, with a full-faced mask. Patient had no issues with the recalled machines.

## 2024-08-20 NOTE — ASSESSMENT
[FreeTextEntry1] : Assessment: RUSTY Obesity EDS controlled   PLAN: The patient is benefitting from the PAP device. New supplies will be ordered when required. Weight loss maintenance discussed. I stressed the need maintain compliance with the PAP device. The patient is not to use an Ozone or UV sterilizer. F/U in 12 months

## 2025-05-14 ENCOUNTER — OUTPATIENT (OUTPATIENT)
Dept: OUTPATIENT SERVICES | Facility: HOSPITAL | Age: 55
LOS: 1 days | End: 2025-05-14
Payer: COMMERCIAL

## 2025-05-14 DIAGNOSIS — Z95.828 PRESENCE OF OTHER VASCULAR IMPLANTS AND GRAFTS: Chronic | ICD-10-CM

## 2025-05-14 DIAGNOSIS — Z90.3 ACQUIRED ABSENCE OF STOMACH [PART OF]: Chronic | ICD-10-CM

## 2025-05-14 DIAGNOSIS — Z00.8 ENCOUNTER FOR OTHER GENERAL EXAMINATION: ICD-10-CM

## 2025-05-14 DIAGNOSIS — R07.9 CHEST PAIN, UNSPECIFIED: ICD-10-CM

## 2025-05-14 PROCEDURE — 93018 CV STRESS TEST I&R ONLY: CPT

## 2025-05-14 PROCEDURE — 78452 HT MUSCLE IMAGE SPECT MULT: CPT

## 2025-05-14 PROCEDURE — A9500: CPT

## 2025-05-14 PROCEDURE — 78452 HT MUSCLE IMAGE SPECT MULT: CPT | Mod: 26

## 2025-05-15 DIAGNOSIS — R07.9 CHEST PAIN, UNSPECIFIED: ICD-10-CM

## 2025-06-09 ENCOUNTER — TRANSCRIPTION ENCOUNTER (OUTPATIENT)
Age: 55
End: 2025-06-09

## 2025-06-16 ENCOUNTER — OUTPATIENT (OUTPATIENT)
Dept: OUTPATIENT SERVICES | Facility: HOSPITAL | Age: 55
LOS: 1 days | End: 2025-06-16
Payer: COMMERCIAL

## 2025-06-16 DIAGNOSIS — Z95.828 PRESENCE OF OTHER VASCULAR IMPLANTS AND GRAFTS: Chronic | ICD-10-CM

## 2025-06-16 DIAGNOSIS — Z90.3 ACQUIRED ABSENCE OF STOMACH [PART OF]: Chronic | ICD-10-CM

## 2025-06-16 DIAGNOSIS — R60.0 LOCALIZED EDEMA: ICD-10-CM

## 2025-06-16 DIAGNOSIS — Z00.8 ENCOUNTER FOR OTHER GENERAL EXAMINATION: ICD-10-CM

## 2025-06-16 PROCEDURE — 93971 EXTREMITY STUDY: CPT | Mod: 26,RT

## 2025-06-16 PROCEDURE — 93971 EXTREMITY STUDY: CPT | Mod: RT

## 2025-06-17 DIAGNOSIS — R60.0 LOCALIZED EDEMA: ICD-10-CM
